# Patient Record
Sex: MALE | Employment: FULL TIME | URBAN - NONMETROPOLITAN AREA
[De-identification: names, ages, dates, MRNs, and addresses within clinical notes are randomized per-mention and may not be internally consistent; named-entity substitution may affect disease eponyms.]

---

## 2021-03-18 NOTE — PROGRESS NOTES
Subjective    Mr. Roberson is 35 y.o. male    Chief Complaint: Vas Consult    History of Present Illness     The patient has been pondering the option of a vasectomy for1 month. Anatomically this is a lower genital tract issue/procedure. With regard to context of the decision, he presently has 2 children. He is . Associated/Relevant symptoms/signs include None. He voices no additional questions about birth control options.       The following portions of the patient's history were reviewed and updated as appropriate: allergies, current medications, past family history, past medical history, past social history, past surgical history and problem list.    Review of Systems   Constitutional: Negative for appetite change and fever.   HENT: Negative for hearing loss and sore throat.    Eyes: Negative for pain and redness.   Respiratory: Negative for cough and shortness of breath.    Cardiovascular: Negative for chest pain and leg swelling.   Gastrointestinal: Negative for anal bleeding, nausea and vomiting.   Endocrine: Negative for cold intolerance and heat intolerance.   Genitourinary: Negative for dysuria, flank pain, frequency, hematuria and urgency.   Musculoskeletal: Negative for joint swelling and myalgias.   Skin: Negative for color change and rash.   Allergic/Immunologic: Negative for immunocompromised state.   Neurological: Negative for dizziness and speech difficulty.   Hematological: Negative for adenopathy. Does not bruise/bleed easily.   Psychiatric/Behavioral: Negative for dysphoric mood and suicidal ideas.       No current outpatient medications on file.    History reviewed. No pertinent past medical history.    Past Surgical History:   Procedure Laterality Date   • HERNIA REPAIR         Social History     Socioeconomic History   • Marital status:      Spouse name: Not on file   • Number of children: Not on file   • Years of education: Not on file   • Highest education level: Not on file  "  Tobacco Use   • Smoking status: Never Smoker   • Smokeless tobacco: Never Used   Vaping Use   • Vaping Use: Never used   Substance and Sexual Activity   • Alcohol use: Yes   • Drug use: Never   • Sexual activity: Yes     Partners: Female       Family History   Problem Relation Age of Onset   • Hypertension Father        Objective    Temp 97.5 °F (36.4 °C) (Temporal)   Ht 188 cm (74\")   Wt 101 kg (223 lb 6.4 oz)   BMI 28.68 kg/m²     Physical Exam  Vitals reviewed.   Constitutional:       General: He is not in acute distress.     Appearance: Normal appearance. He is well-developed. He is not diaphoretic.   HENT:      Head: Normocephalic and atraumatic.      Right Ear: Ear canal and external ear normal.      Left Ear: Ear canal and external ear normal.      Nose: No nasal deformity.      Mouth/Throat:      Mouth: Mucous membranes are not pale, not dry and not cyanotic.      Dentition: Normal dentition.      Pharynx: No oropharyngeal exudate.   Neck:      Thyroid: No thyroid mass or thyromegaly.      Trachea: Trachea normal. No tracheal tenderness or tracheal deviation.   Pulmonary:      Effort: Pulmonary effort is normal. No accessory muscle usage or respiratory distress.   Chest:      Chest wall: No tenderness.   Abdominal:      General: There is no distension.      Palpations: Abdomen is soft. There is no mass.      Tenderness: There is no abdominal tenderness.      Hernia: No hernia is present.      Comments: Rectal examination or stool specimen is not indicated.    Genitourinary:     Penis: Normal.       Testes: Normal.      Comments: Palpable vas bilaterally  Musculoskeletal:      Comments: Normal gait and station. The spine, ribs, and pelvis are examined. No obvious misalignment or asymmetry. ROM is reasonable for age. No instability. No obvious atrophy, flaccidity or spasticity.    Lymphadenopathy:      Cervical: No cervical adenopathy.   Skin:     General: Skin is warm and dry.      Coloration: Skin is " not pale.      Findings: No lesion or rash.      Nails: There is no clubbing.      Comments: On palpation, there were no induration, subcutaneous nodules, or tightening   Neurological:      Mental Status: He is alert and oriented to person, place, and time.   Psychiatric:         Mood and Affect: Mood is not anxious or depressed. Affect is not labile.         Speech: Speech normal.         Behavior: Behavior normal.         Thought Content: Thought content normal.         Judgment: Judgment normal.             No results found for this or any previous visit.  Assessment and Plan    Diagnoses and all orders for this visit:    1. Vasectomy evaluation (Primary)        He was given the consent form, pre-vasectomy instruction sheet, and vasectomy booklet. I extensively reviewed with him the likely postoperative recuperative period as well as the need to continue to use contraception until he is notified by us of his sterility. He will have a semen analysis after 20-30 ejaculations. He understands the potential side effects of local anesthesia, bleeding, scrotal hematoma, wound infection, epididymal orchitis, epididymal congestion,  1% risk chronic testicular pain potentially requiring further surgery, sperm granuloma, antisperm antibodies, early recanalization, spontaneous recanalization with pregnancy after demonstration of azoospermia risk of 1 in 2000 and the possible association with prostate cancer. He is aware of alternatives to vasectomy. He has given this careful consideration and wishes to proceed with a vasectomy.

## 2021-03-22 ENCOUNTER — OFFICE VISIT (OUTPATIENT)
Dept: UROLOGY | Facility: CLINIC | Age: 36
End: 2021-03-22

## 2021-03-22 VITALS — HEIGHT: 74 IN | BODY MASS INDEX: 28.67 KG/M2 | TEMPERATURE: 97.5 F | WEIGHT: 223.4 LBS

## 2021-03-22 DIAGNOSIS — Z30.09 VASECTOMY EVALUATION: Primary | ICD-10-CM

## 2021-03-22 PROCEDURE — 99203 OFFICE O/P NEW LOW 30 MIN: CPT | Performed by: UROLOGY

## 2021-03-22 RX ORDER — ALPRAZOLAM 2 MG/1
TABLET ORAL
Qty: 1 TABLET | Refills: 0 | Status: SHIPPED | OUTPATIENT
Start: 2021-03-22

## 2021-03-22 RX ORDER — HYDROCODONE BITARTRATE AND ACETAMINOPHEN 7.5; 325 MG/1; MG/1
1 TABLET ORAL EVERY 6 HOURS PRN
Qty: 12 TABLET | Refills: 0 | Status: SHIPPED | OUTPATIENT
Start: 2021-03-22

## 2021-04-26 ENCOUNTER — TELEPHONE (OUTPATIENT)
Dept: UROLOGY | Facility: CLINIC | Age: 36
End: 2021-04-26

## 2021-05-18 DIAGNOSIS — Z30.09 VASECTOMY EVALUATION: Primary | ICD-10-CM

## 2021-05-19 ENCOUNTER — OFFICE VISIT (OUTPATIENT)
Dept: UROLOGY | Facility: CLINIC | Age: 36
End: 2021-05-19

## 2021-05-19 DIAGNOSIS — Z30.2 ENCOUNTER FOR VASECTOMY: Primary | ICD-10-CM

## 2021-05-19 PROCEDURE — 55250 REMOVAL OF SPERM DUCT(S): CPT | Performed by: UROLOGY

## 2021-05-19 NOTE — PROGRESS NOTES
No Scalpel Vasectomy Procedure Note    Indications: 35 y.o. male desiring permanent sterilization    Pre-operative Diagnosis: Undesired fertility    Post-operative Diagnosis: Undesired fertility    Anesthesia: Lidocaine 1% without epinephrine     Procedure Details     The risks and benefits of the procedure were discussed at the pre-procedure consultation, and written, informed consent obtained.    Premedicated with Norco 7.5 and Xanax 2 mg 30 minutes prior to procedure.    The scrotum was palpated with both testes normal in size and position, no masses palpated. The scrotum was cleansed with warm Betadine and draped in the usual sterile manner.     A vasal sheath block was performed on both the left and right vas.  After adequate anesthesia was established, a small perforation was made in the skin and the right vas was isolated with the ring forceps, dissected free and delivered through the skin perforation.  The right vas was divided, approximately 3 cm portion removed, and each end of the vas was cauterized.  The ends of the vas were replaced in the scrotum through the puncture site.  The left vas was then isolated, divided, cauterized in a similar fashion.  Midportions removed not sent to pathology to confirm because they were grossly normal.     Any bleeding was controlled with electrocautery.  3.0 Chormic interrupted suture was used to close both sites. The puncture site was dry when the procedure was completed. Dressing was applied to both incisions and jock strap placed for scrotal support.    Specimen: None    Condition: Stable    Complications: None    Plan:  1. Continue contraception until negative sperm analysis. Bring 2 semen samples after 20-30 ejaculates  2. Warning signs of infection were reviewed.   3. Patient is taken home by significant other with written home care instructions.  • Bedrest X 48 hrs, Ice pack every 3 hours for 24 hrs.    • Call the clinic if excessive pain, bleeding or swelling.

## 2025-05-29 ENCOUNTER — OFFICE VISIT (OUTPATIENT)
Dept: FAMILY MEDICINE CLINIC | Facility: CLINIC | Age: 40
End: 2025-05-29
Payer: COMMERCIAL

## 2025-05-29 VITALS
WEIGHT: 208 LBS | TEMPERATURE: 98.4 F | HEART RATE: 67 BPM | SYSTOLIC BLOOD PRESSURE: 127 MMHG | OXYGEN SATURATION: 98 % | BODY MASS INDEX: 26.69 KG/M2 | DIASTOLIC BLOOD PRESSURE: 74 MMHG | HEIGHT: 74 IN

## 2025-05-29 DIAGNOSIS — Z02.89 ENCOUNTER FOR PHYSICAL EXAMINATION OF PROSPECTIVE FOSTER PARENT: Primary | ICD-10-CM

## 2025-05-29 DIAGNOSIS — J30.9 ALLERGIC RHINITIS, UNSPECIFIED SEASONALITY, UNSPECIFIED TRIGGER: ICD-10-CM

## 2025-05-29 RX ORDER — CETIRIZINE HYDROCHLORIDE 10 MG/1
10 TABLET ORAL DAILY
COMMUNITY

## 2025-05-29 RX ORDER — TRIAMCINOLONE ACETONIDE 55 UG/1
SPRAY, METERED NASAL
COMMUNITY

## 2025-05-29 RX ORDER — AZELASTINE HYDROCHLORIDE 137 UG/1
SPRAY, METERED NASAL
COMMUNITY

## 2025-05-30 PROBLEM — Z91.018 ALLERGY TO ALPHA-GAL: Status: ACTIVE | Noted: 2023-01-03

## 2025-05-30 PROBLEM — J30.9 ALLERGIC RHINITIS: Status: ACTIVE | Noted: 2021-11-19

## 2025-05-30 NOTE — PROGRESS NOTES
"CC:   Chief Complaint   Patient presents with    Annual Exam     Patient needing  physical for adoption paperwork        History:  Chinmay Roberson is a 39 y.o. male who presents today for evaluation of the above problems.      HPI  Patient presents for physical exam for  application.  Patient is here with his wife and 2 children.  States they are looking into becoming foster parents and completing the application process.  Patient is very healthy.  Only takes allergy medications.  He is very active.  Denies any mental illness or other concerns.    No Known Allergies  History reviewed. No pertinent past medical history.  Past Surgical History:   Procedure Laterality Date    HERNIA REPAIR       Family History   Problem Relation Age of Onset    Hypertension Father       reports that he has never smoked. He has never used smokeless tobacco. He reports current alcohol use. He reports that he does not use drugs.      Current Outpatient Medications:     Azelastine HCl 137 MCG/SPRAY solution, ADMINISTER 2 SPRAYS INTO EACH NOSTRIL 2 TIMES A DAY., Disp: , Rfl:     cetirizine (zyrTEC) 10 MG tablet, Take 1 tablet by mouth Daily., Disp: , Rfl:     Triamcinolone Acetonide (NASACORT) 55 MCG/ACT nasal inhaler, administer 2 sprays (110 mcg total) into each nostril daily., Disp: , Rfl:     OBJECTIVE:  /74 (BP Location: Left arm, Patient Position: Sitting, Cuff Size: Adult)   Pulse 67   Temp 98.4 °F (36.9 °C) (Temporal)   Ht 188 cm (74\")   Wt 94.3 kg (208 lb)   SpO2 98%   BMI 26.71 kg/m²    Estimated body mass index is 26.71 kg/m² as calculated from the following:    Height as of this encounter: 188 cm (74\").    Weight as of this encounter: 94.3 kg (208 lb).                Physical Exam  Vitals reviewed.   Constitutional:       General: He is not in acute distress.     Appearance: Normal appearance.   HENT:      Head: Normocephalic and atraumatic.      Right Ear: Tympanic membrane, ear canal and external ear " normal.      Left Ear: Tympanic membrane, ear canal and external ear normal.      Mouth/Throat:      Mouth: Mucous membranes are moist.      Pharynx: Oropharynx is clear.   Eyes:      Pupils: Pupils are equal, round, and reactive to light.   Cardiovascular:      Rate and Rhythm: Normal rate and regular rhythm.      Heart sounds: Normal heart sounds.   Pulmonary:      Effort: No respiratory distress.      Breath sounds: Normal breath sounds. No wheezing.   Abdominal:      General: Bowel sounds are normal. There is no distension.      Palpations: Abdomen is soft.      Tenderness: There is no abdominal tenderness.   Musculoskeletal:         General: Normal range of motion.      Cervical back: Normal range of motion.   Lymphadenopathy:      Cervical: No cervical adenopathy.   Skin:     General: Skin is warm and dry.   Neurological:      Mental Status: He is alert and oriented to person, place, and time.   Psychiatric:         Mood and Affect: Mood normal.         Behavior: Behavior normal.         Thought Content: Thought content normal.         Judgment: Judgment normal.              Assessment/Plan    Diagnoses and all orders for this visit:    1. Encounter for physical examination of prospective  (Primary)    2. Allergic rhinitis, unspecified seasonality, unspecified trigger    Paperwork completed for  application.  See in chart.  Patient's PCP is Dr. Salazar.  I see his other family members so they all presented today for evaluation.  All questions answered.  Continue healthy diet and exercise.  Continue allergy medicines.            An After Visit Summary was printed and given to the patient at discharge.  Return if symptoms worsen or fail to improve.